# Patient Record
(demographics unavailable — no encounter records)

---

## 2025-02-06 NOTE — PHYSICAL EXAM
[Walk Alone] : walks alone [Mature Pincer] : has mature pincer [Voluntary Release] : voluntary release  [Cup] : uses a cup [Understands "No"] : understands "No" [1 Step Command with Gesture] : follows 1 step commands with gesture [Vocabulary Of ___ Words] : has a vocabulary of [unfilled] words [Truncal Tone] : normal truncal tone [Normal] : shoulder, elbow, wrist, hand, hip, knee and ankle tones normal bilaterally [de-identified] : stoop and recovers well [de-identified] : points to request, pointed to 1 picture upon request (dog)

## 2025-02-06 NOTE — REASON FOR VISIT
[Initial Visit] : an initial visit for [Mother] : mother [Father] : father [FreeTextEntry2] : assess for developmental delay secondary to prematurity 34.1 weeks [FreeTextEntry3] : Developmental and behavioral progress is of the utmost importance and involves complex nuance. Monitoring children with developmental and behavioral concerns is essential due to potential lifelong implications of diagnoses.

## 2025-02-06 NOTE — SOCIAL HISTORY
[TextEntry] : lives home with both parents Mother is a  Father is an auto  Maternal grandmother helps with care when parents are at work No smoker at home No access to firearm

## 2025-02-06 NOTE — HISTORY OF PRESENT ILLNESS
[Gestational Age: ___] : Gestational Age in Weeks: [unfilled] [Chronological Age: ___] : Chronological Age in Months: [unfilled] [Corrected Age: ___] : Corrected Age: [unfilled] [No Parental Concerns] : no parental concerns [No Feeding Issues] : no feeding issues. [Finger Food] : finger food [Table Food] : table food [Normal] : normal [None] : none [de-identified] : cow's milk [de-identified] : 8 - 4:30 AM (wakes up because parents need to drop him at grandma's to go to work). Sleeps until 8 AM on weekends

## 2025-02-06 NOTE — END OF VISIT
[] : Fellow [FreeTextEntry3] : I have personally seen and examined this patient. I have fully participated in the care of this patient. I have personally reviewed all the pertinent clinical information, including history, physical exam, plan and the Fellow's note, and agree. I spent approximately 40 minutes on the this visit in the care of this patient, including discussion of the case with the fellow, review of any pertinent testing/rating scales, participation during the telehealth visit, and note/report writing [Time Spent: ___ minutes] : I have spent [unfilled] minutes of time on the encounter which excludes teaching and separately reported services.

## 2025-02-06 NOTE — BIRTH HISTORY
[At ___ Weeks Gestation] : at [unfilled] weeks gestation [Normal Vaginal Route] : by normal vaginal route [FreeTextEntry1] : 1193 grams  [FreeTextEntry3] : pregnancy complicated by PPROM and PTL